# Patient Record
Sex: MALE | URBAN - METROPOLITAN AREA
[De-identification: names, ages, dates, MRNs, and addresses within clinical notes are randomized per-mention and may not be internally consistent; named-entity substitution may affect disease eponyms.]

---

## 2018-12-06 ENCOUNTER — OFFICE VISIT (OUTPATIENT)
Dept: URBAN - METROPOLITAN AREA CLINIC 11 | Facility: CLINIC | Age: 63
End: 2018-12-06
Payer: MEDICARE

## 2018-12-06 DIAGNOSIS — H25.13 AGE-RELATED NUCLEAR CATARACT, BILATERAL: ICD-10-CM

## 2018-12-06 PROCEDURE — 92004 COMPRE OPH EXAM NEW PT 1/>: CPT | Performed by: OPTOMETRIST

## 2018-12-06 ASSESSMENT — INTRAOCULAR PRESSURE
OD: 17
OS: 17

## 2018-12-06 ASSESSMENT — VISUAL ACUITY
OD: 20/25
OS: 20/30

## 2018-12-06 ASSESSMENT — KERATOMETRY
OD: 41.38
OS: 41.88

## 2018-12-06 NOTE — IMPRESSION/PLAN
Impression: Cysts of right lower eyelid: H02.822. Plan: monitor for now.  refer to  [facility] for eval/tx

## 2018-12-06 NOTE — IMPRESSION/PLAN
Impression: Age-related nuclear cataract, bilateral: H25.13. Plan: Discussed diagnosis in detail with patient. No treatment is required at this time. Will continue to observe condition and or symptoms. Call if [address] worsens.

## 2019-01-31 ENCOUNTER — OFFICE VISIT (OUTPATIENT)
Dept: URBAN - METROPOLITAN AREA CLINIC 11 | Facility: CLINIC | Age: 64
End: 2019-01-31
Payer: MEDICARE

## 2019-01-31 DIAGNOSIS — H02.822 CYSTS OF RIGHT LOWER EYELID: Primary | Chronic | ICD-10-CM

## 2019-01-31 PROCEDURE — 92012 INTRM OPH EXAM EST PATIENT: CPT | Performed by: OPHTHALMOLOGY

## 2019-01-31 PROCEDURE — 92002 INTRM OPH EXAM NEW PATIENT: CPT | Performed by: OPHTHALMOLOGY

## 2019-01-31 ASSESSMENT — INTRAOCULAR PRESSURE
OD: 16
OS: 16

## 2019-01-31 NOTE — IMPRESSION/PLAN
Impression: Cysts of right lower eyelid: H02.822. OD. Condition: established, worsening. Symptoms: may improve with surgery. Vision: vision threatening. Plan: Discussed diagnosis in detail with patient. Discussed treatment options with patient. Surgical treatment is required. Surgical risks and benefits were discussed, explained and understood by patient. Patient elects to have surgery. Recommend cyst excision of right lower eyelid. Obtain insurance verification and schedule minor office procedure.

## 2019-01-31 NOTE — IMPRESSION/PLAN
Impression: Cysts of left lower eyelid: H02.825. OS. Condition: established, worsening. Symptoms: may improve with surgery. Vision: vision threatening. Plan: Discussed diagnosis in detail with patient. Discussed treatment options with patient. Surgical treatment is required. Surgical risks and benefits were discussed, explained and understood by patient. Patient elects to have surgery. Recommend cyst excision of left lower eyelid. Obtain insurance verification and schedule minor office procedure after right lower eyelid is done and healed.

## 2019-02-14 ENCOUNTER — PROCEDURE (OUTPATIENT)
Dept: URBAN - METROPOLITAN AREA CLINIC 11 | Facility: CLINIC | Age: 64
End: 2019-02-14
Payer: MEDICARE

## 2019-02-14 PROCEDURE — 67840 REMOVE EYELID LESION: CPT | Performed by: OPHTHALMOLOGY

## 2019-02-14 RX ORDER — ERYTHROMYCIN 5 MG/G
OINTMENT OPHTHALMIC
Qty: 1 | Refills: 0 | Status: INACTIVE
Start: 2019-02-14 | End: 2019-02-18

## 2019-03-14 ENCOUNTER — PROCEDURE (OUTPATIENT)
Dept: URBAN - METROPOLITAN AREA CLINIC 11 | Facility: CLINIC | Age: 64
End: 2019-03-14
Payer: MEDICARE

## 2019-03-14 DIAGNOSIS — H02.825 CYSTS OF LEFT LOWER EYELID: Primary | ICD-10-CM

## 2019-03-14 PROCEDURE — 67840 REMOVE EYELID LESION: CPT | Performed by: OPHTHALMOLOGY
